# Patient Record
Sex: FEMALE | Race: WHITE | NOT HISPANIC OR LATINO | URBAN - METROPOLITAN AREA
[De-identification: names, ages, dates, MRNs, and addresses within clinical notes are randomized per-mention and may not be internally consistent; named-entity substitution may affect disease eponyms.]

---

## 2017-10-25 ENCOUNTER — APPOINTMENT (OUTPATIENT)
Age: 42
Setting detail: DERMATOLOGY
End: 2017-10-25

## 2017-10-25 DIAGNOSIS — L91.8 OTHER HYPERTROPHIC DISORDERS OF THE SKIN: ICD-10-CM

## 2017-10-25 DIAGNOSIS — D485 NEOPLASM OF UNCERTAIN BEHAVIOR OF SKIN: ICD-10-CM

## 2017-10-25 DIAGNOSIS — Z87.2 PERSONAL HISTORY OF DISEASES OF THE SKIN AND SUBCUTANEOUS TISSUE: ICD-10-CM

## 2017-10-25 DIAGNOSIS — I83.9 ASYMPTOMATIC VARICOSE VEINS OF LOWER EXTREMITIES: ICD-10-CM

## 2017-10-25 DIAGNOSIS — L57.8 OTHER SKIN CHANGES DUE TO CHRONIC EXPOSURE TO NONIONIZING RADIATION: ICD-10-CM

## 2017-10-25 PROBLEM — I83.90 ASYMPTOMATIC VARICOSE VEINS OF UNSPECIFIED LOWER EXTREMITY: Status: ACTIVE | Noted: 2017-10-25

## 2017-10-25 PROBLEM — D48.5 NEOPLASM OF UNCERTAIN BEHAVIOR OF SKIN: Status: ACTIVE | Noted: 2017-10-25

## 2017-10-25 PROCEDURE — OTHER COUNSELING: OTHER

## 2017-10-25 PROCEDURE — 11101: CPT

## 2017-10-25 PROCEDURE — 99214 OFFICE O/P EST MOD 30 MIN: CPT | Mod: 25

## 2017-10-25 PROCEDURE — 11100: CPT

## 2017-10-25 PROCEDURE — OTHER BIOPSY BY SHAVE METHOD: OTHER

## 2017-10-25 ASSESSMENT — LOCATION DETAILED DESCRIPTION DERM
LOCATION DETAILED: RIGHT INFERIOR MEDIAL UPPER BACK
LOCATION DETAILED: LEFT LATERAL BREAST 2-3:00 REGION
LOCATION DETAILED: LEFT ANTERIOR PROXIMAL THIGH
LOCATION DETAILED: RIGHT MID-UPPER BACK
LOCATION DETAILED: LEFT INFERIOR LATERAL NECK
LOCATION DETAILED: LEFT ANTERIOR SHOULDER

## 2017-10-25 ASSESSMENT — LOCATION ZONE DERM
LOCATION ZONE: NECK
LOCATION ZONE: TRUNK
LOCATION ZONE: ARM
LOCATION ZONE: LEG

## 2017-10-25 ASSESSMENT — LOCATION SIMPLE DESCRIPTION DERM
LOCATION SIMPLE: LEFT ANTERIOR NECK
LOCATION SIMPLE: LEFT SHOULDER
LOCATION SIMPLE: LEFT THIGH
LOCATION SIMPLE: LEFT BREAST
LOCATION SIMPLE: RIGHT UPPER BACK

## 2017-10-25 NOTE — PROCEDURE: BIOPSY BY SHAVE METHOD
X Size Of Lesion In Cm: 0
Bill For Surgical Tray: no
Hemostasis: Drysol
Billing Type: Third-Party Bill
Detail Level: Detailed
Anesthesia Volume In Cc: 0.2
Biopsy Method: Dermablade
Wound Care: Bacitracin
Size Of Lesion In Cm: 0.4
Consent: Written consent was obtained and risks were reviewed including but not limited to scarring, infection, bleeding, scabbing, incomplete removal, nerve damage and allergy to anesthesia.
Biopsy Type: H and E
Silver Nitrate Text: The wound bed was treated with silver nitrate after the biopsy was performed.
Dressing: no dressing applied
Curettage Text: The wound bed was treated with curettage after the biopsy was performed.
Type Of Destruction Used: Curettage
Electrodesiccation Text: The wound bed was treated with electrodesiccation after the biopsy was performed.
Cryotherapy Text: The wound bed was treated with cryotherapy after the biopsy was performed.
Size Of Lesion In Cm: 0.5
Anesthesia Type: 1% lidocaine with epinephrine
Notification Instructions: Patient will be notified of biopsy results. However, patient instructed to call the office if not contacted within 2 weeks.
Electrodesiccation And Curettage Text: The wound bed was treated with electrodesiccation and curettage after the biopsy was performed.
Post-Care Instructions: I reviewed with the patient in detail post-care instructions. Patient is to keep the biopsy site dry overnight, and then apply bacitracin twice daily until healed. Patient may apply hydrogen peroxide soaks to remove any crusting.

## 2018-04-03 ENCOUNTER — APPOINTMENT (OUTPATIENT)
Age: 43
Setting detail: DERMATOLOGY
End: 2018-04-03

## 2018-04-03 DIAGNOSIS — L91.8 OTHER HYPERTROPHIC DISORDERS OF THE SKIN: ICD-10-CM

## 2018-04-03 DIAGNOSIS — I83.9 ASYMPTOMATIC VARICOSE VEINS OF LOWER EXTREMITIES: ICD-10-CM

## 2018-04-03 PROBLEM — J45.909 UNSPECIFIED ASTHMA, UNCOMPLICATED: Status: ACTIVE | Noted: 2018-04-03

## 2018-04-03 PROBLEM — I83.93 ASYMPTOMATIC VARICOSE VEINS OF BILATERAL LOWER EXTREMITIES: Status: ACTIVE | Noted: 2018-04-03

## 2018-04-03 PROCEDURE — 99213 OFFICE O/P EST LOW 20 MIN: CPT

## 2018-04-03 PROCEDURE — OTHER SKIN TAG REMOVAL MULTI (COSMETIC): OTHER

## 2018-04-03 PROCEDURE — OTHER TREATMENT REGIMEN: OTHER

## 2018-04-03 PROCEDURE — OTHER MEDICAL CONSULTATION: SCLEROTHERAPY: OTHER

## 2018-04-03 PROCEDURE — OTHER COUNSELING: OTHER

## 2018-04-03 ASSESSMENT — LOCATION DETAILED DESCRIPTION DERM
LOCATION DETAILED: RIGHT PROXIMAL PRETIBIAL REGION
LOCATION DETAILED: LEFT PROXIMAL PRETIBIAL REGION
LOCATION DETAILED: RIGHT INFERIOR LATERAL NECK
LOCATION DETAILED: LEFT AXILLARY VAULT
LOCATION DETAILED: RIGHT ANTERIOR DISTAL THIGH
LOCATION DETAILED: RIGHT AXILLARY VAULT
LOCATION DETAILED: LEFT ANTERIOR AXILLA
LOCATION DETAILED: RIGHT ANTERIOR AXILLA
LOCATION DETAILED: LEFT INFERIOR ANTERIOR NECK

## 2018-04-03 ASSESSMENT — LOCATION SIMPLE DESCRIPTION DERM
LOCATION SIMPLE: RIGHT PRETIBIAL REGION
LOCATION SIMPLE: LEFT AXILLARY VAULT
LOCATION SIMPLE: RIGHT AXILLARY VAULT
LOCATION SIMPLE: RIGHT ANTERIOR NECK
LOCATION SIMPLE: RIGHT AXILLA
LOCATION SIMPLE: LEFT ANTERIOR NECK
LOCATION SIMPLE: RIGHT THIGH
LOCATION SIMPLE: LEFT PRETIBIAL REGION
LOCATION SIMPLE: LEFT AXILLA

## 2018-04-03 ASSESSMENT — LOCATION ZONE DERM
LOCATION ZONE: NECK
LOCATION ZONE: AXILLAE
LOCATION ZONE: LEG

## 2018-04-03 NOTE — PROCEDURE: SKIN TAG REMOVAL MULTI (COSMETIC)
Price (Use Numbers Only, No Special Characters Or $): 150
Detail Level: Simple
Consent: Written consent obtained and the risks of skin tag removal was reviewed with the patient including but not limited to bleeding, pigmentary change, infection, pain, and remote possibility of scarring.
Anesthesia Volume In Cc: 1
Hemostasis: Drysol
Total Number Of Lesions Treated: 9
Anesthesia Type: 1% lidocaine with epinephrine
Removed With: gradle excision

## 2018-04-03 NOTE — PROCEDURE: TREATMENT REGIMEN
Plan: also discussed laser treatment for veins at [facility]. Pt will consider options and will call to schedule apt
Detail Level: Zone

## 2018-06-19 ENCOUNTER — APPOINTMENT (OUTPATIENT)
Age: 43
Setting detail: DERMATOLOGY
End: 2018-06-19

## 2018-06-19 DIAGNOSIS — Z41.9 ENCOUNTER FOR PROCEDURE FOR PURPOSES OTHER THAN REMEDYING HEALTH STATE, UNSPECIFIED: ICD-10-CM

## 2018-06-19 PROCEDURE — OTHER COSMETIC CONSULTATION: VEIN REMOVAL: OTHER

## 2018-06-19 ASSESSMENT — LOCATION DETAILED DESCRIPTION DERM
LOCATION DETAILED: RIGHT PROXIMAL CALF
LOCATION DETAILED: RIGHT ANTERIOR PROXIMAL THIGH

## 2018-06-19 ASSESSMENT — LOCATION SIMPLE DESCRIPTION DERM
LOCATION SIMPLE: RIGHT THIGH
LOCATION SIMPLE: RIGHT CALF

## 2018-06-19 ASSESSMENT — LOCATION ZONE DERM: LOCATION ZONE: LEG

## 2021-06-02 ENCOUNTER — APPOINTMENT (OUTPATIENT)
Age: 46
Setting detail: DERMATOLOGY
End: 2021-06-02

## 2021-06-02 DIAGNOSIS — L57.8 OTHER SKIN CHANGES DUE TO CHRONIC EXPOSURE TO NONIONIZING RADIATION: ICD-10-CM

## 2021-06-02 DIAGNOSIS — Z87.2 PERSONAL HISTORY OF DISEASES OF THE SKIN AND SUBCUTANEOUS TISSUE: ICD-10-CM

## 2021-06-02 PROCEDURE — 99212 OFFICE O/P EST SF 10 MIN: CPT

## 2021-06-02 PROCEDURE — OTHER MIPS QUALITY: OTHER

## 2021-06-02 PROCEDURE — OTHER COUNSELING: OTHER

## 2021-06-02 ASSESSMENT — LOCATION DETAILED DESCRIPTION DERM
LOCATION DETAILED: RIGHT MID-UPPER BACK
LOCATION DETAILED: RIGHT INFERIOR MEDIAL UPPER BACK

## 2021-06-02 ASSESSMENT — LOCATION ZONE DERM: LOCATION ZONE: TRUNK

## 2021-06-02 ASSESSMENT — LOCATION SIMPLE DESCRIPTION DERM: LOCATION SIMPLE: RIGHT UPPER BACK
